# Patient Record
Sex: MALE | Race: WHITE | NOT HISPANIC OR LATINO | URBAN - METROPOLITAN AREA
[De-identification: names, ages, dates, MRNs, and addresses within clinical notes are randomized per-mention and may not be internally consistent; named-entity substitution may affect disease eponyms.]

---
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
## 2024-08-05 NOTE — PROCEDURE: MEDICATION COUNSELING
Anesthesia Type: 1% lidocaine with epinephrine Spot Size: Finesse Adapter Size: 15 x 15 mm square Procedure Note: After applying gel and applying protective eyeware, treatment was administered using the setting parameters listed above. Pulse Width Units: milliseconds Treatment Number: 0 Filter: 590ST Filter Rate (Hz): 4 Post-Care Instructions: I reviewed with the patient in detail post-care instructions. Patient should avoid sun exposure before and after treatment.  Patient should wear sunscreen. Add Setting 6?: no Temp (C): 30 Fluence (J/Cm2) - Will Not Render If 0: 5 Pulse Width - Will Not Render If 0: 3 Spot Size: Finesse Adapter Size: 15 x 45 mm (No Finesse Adapter) Filter: 515nm Filter Detail Level: Simple Filter: 560nm Filter Filter: 640nm Filter Temp (C): 25 Consent: Written consent obtained.  Risks reviewed including but not limited to crusting, scabbing, blistering, scarring, darker or lighter pigmentary change, and incomplete clearance. Cimzia Counseling:  I discussed with the patient the risks of Cimzia including but not limited to immunosuppression, allergic reactions and infections.  The patient understands that monitoring is required including a PPD at baseline and must alert us or the primary physician if symptoms of infection or other concerning signs are noted.